# Patient Record
Sex: FEMALE | Race: WHITE | HISPANIC OR LATINO | URBAN - METROPOLITAN AREA
[De-identification: names, ages, dates, MRNs, and addresses within clinical notes are randomized per-mention and may not be internally consistent; named-entity substitution may affect disease eponyms.]

---

## 2017-11-25 ENCOUNTER — APPOINTMENT (EMERGENCY)
Dept: RADIOLOGY | Facility: HOSPITAL | Age: 62
End: 2017-11-25
Payer: COMMERCIAL

## 2017-11-25 ENCOUNTER — HOSPITAL ENCOUNTER (OUTPATIENT)
Facility: HOSPITAL | Age: 62
Setting detail: OBSERVATION
Discharge: HOME | End: 2017-11-25
Attending: EMERGENCY MEDICINE | Admitting: SURGERY
Payer: COMMERCIAL

## 2017-11-25 VITALS
BODY MASS INDEX: 35.7 KG/M2 | TEMPERATURE: 98.4 F | WEIGHT: 201.5 LBS | HEART RATE: 70 BPM | DIASTOLIC BLOOD PRESSURE: 67 MMHG | HEIGHT: 63 IN | SYSTOLIC BLOOD PRESSURE: 143 MMHG | RESPIRATION RATE: 20 BRPM | OXYGEN SATURATION: 96 %

## 2017-11-25 DIAGNOSIS — K56.609 SBO (SMALL BOWEL OBSTRUCTION) (HCC): Primary | ICD-10-CM

## 2017-11-25 LAB
ALBUMIN SERPL BCP-MCNC: 3.8 G/DL (ref 3.5–5)
ALP SERPL-CCNC: 98 U/L (ref 46–116)
ALT SERPL W P-5'-P-CCNC: 25 U/L (ref 12–78)
ANION GAP SERPL CALCULATED.3IONS-SCNC: 7 MMOL/L (ref 4–13)
AST SERPL W P-5'-P-CCNC: 21 U/L (ref 5–45)
ATRIAL RATE: 77 BPM
BASOPHILS # BLD AUTO: 0.02 THOUSANDS/ΜL (ref 0–0.1)
BASOPHILS NFR BLD AUTO: 0 % (ref 0–1)
BILIRUB SERPL-MCNC: 0.47 MG/DL (ref 0.2–1)
BUN SERPL-MCNC: 20 MG/DL (ref 5–25)
CALCIUM SERPL-MCNC: 9.4 MG/DL (ref 8.3–10.1)
CHLORIDE SERPL-SCNC: 102 MMOL/L (ref 100–108)
CO2 SERPL-SCNC: 27 MMOL/L (ref 21–32)
CREAT SERPL-MCNC: 0.69 MG/DL (ref 0.6–1.3)
EOSINOPHIL # BLD AUTO: 0.06 THOUSAND/ΜL (ref 0–0.61)
EOSINOPHIL NFR BLD AUTO: 1 % (ref 0–6)
ERYTHROCYTE [DISTWIDTH] IN BLOOD BY AUTOMATED COUNT: 14.2 % (ref 11.6–15.1)
GFR SERPL CREATININE-BSD FRML MDRD: 94 ML/MIN/1.73SQ M
GLUCOSE SERPL-MCNC: 166 MG/DL (ref 65–140)
HCT VFR BLD AUTO: 41.2 % (ref 34.8–46.1)
HGB BLD-MCNC: 13.5 G/DL (ref 11.5–15.4)
LIPASE SERPL-CCNC: 64 U/L (ref 73–393)
LYMPHOCYTES # BLD AUTO: 1.5 THOUSANDS/ΜL (ref 0.6–4.47)
LYMPHOCYTES NFR BLD AUTO: 15 % (ref 14–44)
MCH RBC QN AUTO: 25.4 PG (ref 26.8–34.3)
MCHC RBC AUTO-ENTMCNC: 32.8 G/DL (ref 31.4–37.4)
MCV RBC AUTO: 77 FL (ref 82–98)
MONOCYTES # BLD AUTO: 0.48 THOUSAND/ΜL (ref 0.17–1.22)
MONOCYTES NFR BLD AUTO: 5 % (ref 4–12)
NEUTROPHILS # BLD AUTO: 8.25 THOUSANDS/ΜL (ref 1.85–7.62)
NEUTS SEG NFR BLD AUTO: 79 % (ref 43–75)
NRBC BLD AUTO-RTO: 0 /100 WBCS
P AXIS: 68 DEGREES
PLATELET # BLD AUTO: 232 THOUSANDS/UL (ref 149–390)
PMV BLD AUTO: 10 FL (ref 8.9–12.7)
POTASSIUM SERPL-SCNC: 3.7 MMOL/L (ref 3.5–5.3)
PR INTERVAL: 156 MS
PROT SERPL-MCNC: 8.8 G/DL (ref 6.4–8.2)
QRS AXIS: 20 DEGREES
QRSD INTERVAL: 90 MS
QT INTERVAL: 356 MS
QTC INTERVAL: 402 MS
RBC # BLD AUTO: 5.32 MILLION/UL (ref 3.81–5.12)
SODIUM SERPL-SCNC: 136 MMOL/L (ref 136–145)
SPECIMEN SOURCE: NORMAL
T WAVE AXIS: 31 DEGREES
TROPONIN I BLD-MCNC: 0 NG/ML (ref 0–0.08)
VENTRICULAR RATE: 77 BPM
WBC # BLD AUTO: 10.33 THOUSAND/UL (ref 4.31–10.16)

## 2017-11-25 PROCEDURE — 85025 COMPLETE CBC W/AUTO DIFF WBC: CPT | Performed by: EMERGENCY MEDICINE

## 2017-11-25 PROCEDURE — 84484 ASSAY OF TROPONIN QUANT: CPT

## 2017-11-25 PROCEDURE — 93005 ELECTROCARDIOGRAM TRACING: CPT

## 2017-11-25 PROCEDURE — 96375 TX/PRO/DX INJ NEW DRUG ADDON: CPT

## 2017-11-25 PROCEDURE — 36415 COLL VENOUS BLD VENIPUNCTURE: CPT

## 2017-11-25 PROCEDURE — 99285 EMERGENCY DEPT VISIT HI MDM: CPT

## 2017-11-25 PROCEDURE — 96376 TX/PRO/DX INJ SAME DRUG ADON: CPT

## 2017-11-25 PROCEDURE — 80053 COMPREHEN METABOLIC PANEL: CPT | Performed by: EMERGENCY MEDICINE

## 2017-11-25 PROCEDURE — 90686 IIV4 VACC NO PRSV 0.5 ML IM: CPT | Performed by: SURGERY

## 2017-11-25 PROCEDURE — 96374 THER/PROPH/DIAG INJ IV PUSH: CPT

## 2017-11-25 PROCEDURE — 93005 ELECTROCARDIOGRAM TRACING: CPT | Performed by: EMERGENCY MEDICINE

## 2017-11-25 PROCEDURE — 83690 ASSAY OF LIPASE: CPT | Performed by: EMERGENCY MEDICINE

## 2017-11-25 PROCEDURE — 74177 CT ABD & PELVIS W/CONTRAST: CPT

## 2017-11-25 RX ORDER — KETOROLAC TROMETHAMINE 30 MG/ML
INJECTION, SOLUTION INTRAMUSCULAR; INTRAVENOUS
Status: DISCONTINUED
Start: 2017-11-25 | End: 2017-11-25 | Stop reason: WASHOUT

## 2017-11-25 RX ORDER — KETOROLAC TROMETHAMINE 30 MG/ML
15 INJECTION, SOLUTION INTRAMUSCULAR; INTRAVENOUS ONCE
Status: DISCONTINUED | OUTPATIENT
Start: 2017-11-25 | End: 2017-11-25

## 2017-11-25 RX ORDER — DEXTROSE, SODIUM CHLORIDE, AND POTASSIUM CHLORIDE 5; .45; .15 G/100ML; G/100ML; G/100ML
75 INJECTION INTRAVENOUS CONTINUOUS
Status: DISCONTINUED | OUTPATIENT
Start: 2017-11-25 | End: 2017-11-25

## 2017-11-25 RX ORDER — ACETAMINOPHEN 325 MG/1
650 TABLET ORAL EVERY 6 HOURS PRN
Status: DISCONTINUED | OUTPATIENT
Start: 2017-11-25 | End: 2017-11-25 | Stop reason: HOSPADM

## 2017-11-25 RX ORDER — SODIUM CHLORIDE 9 MG/ML
125 INJECTION, SOLUTION INTRAVENOUS CONTINUOUS
Status: DISCONTINUED | OUTPATIENT
Start: 2017-11-25 | End: 2017-11-25

## 2017-11-25 RX ORDER — OXYCODONE HYDROCHLORIDE 5 MG/1
5 TABLET ORAL EVERY 4 HOURS PRN
Status: DISCONTINUED | OUTPATIENT
Start: 2017-11-25 | End: 2017-11-25 | Stop reason: HOSPADM

## 2017-11-25 RX ORDER — OXYCODONE HYDROCHLORIDE 10 MG/1
10 TABLET ORAL EVERY 4 HOURS PRN
Status: DISCONTINUED | OUTPATIENT
Start: 2017-11-25 | End: 2017-11-25 | Stop reason: HOSPADM

## 2017-11-25 RX ORDER — ONDANSETRON 2 MG/ML
4 INJECTION INTRAMUSCULAR; INTRAVENOUS ONCE
Status: COMPLETED | OUTPATIENT
Start: 2017-11-25 | End: 2017-11-25

## 2017-11-25 RX ORDER — ENALAPRIL MALEATE AND HYDROCHLOROTHIAZIDE 10; 25 MG/1; MG/1
1 TABLET ORAL DAILY
COMMUNITY

## 2017-11-25 RX ORDER — ONDANSETRON 2 MG/ML
INJECTION INTRAMUSCULAR; INTRAVENOUS
Status: COMPLETED
Start: 2017-11-25 | End: 2017-11-25

## 2017-11-25 RX ORDER — ONDANSETRON 2 MG/ML
4 INJECTION INTRAMUSCULAR; INTRAVENOUS EVERY 6 HOURS PRN
Status: DISCONTINUED | OUTPATIENT
Start: 2017-11-25 | End: 2017-11-25 | Stop reason: HOSPADM

## 2017-11-25 RX ADMIN — INFLUENZA VIRUS VACCINE 0.5 ML: 15; 15; 15; 15 SUSPENSION INTRAMUSCULAR at 18:18

## 2017-11-25 RX ADMIN — IOHEXOL 100 ML: 350 INJECTION, SOLUTION INTRAVENOUS at 02:41

## 2017-11-25 RX ADMIN — DEXTROSE, SODIUM CHLORIDE, AND POTASSIUM CHLORIDE 75 ML/HR: 5; .45; .15 INJECTION INTRAVENOUS at 11:15

## 2017-11-25 RX ADMIN — HYDROMORPHONE HYDROCHLORIDE 1 MG: 1 INJECTION, SOLUTION INTRAMUSCULAR; INTRAVENOUS; SUBCUTANEOUS at 03:16

## 2017-11-25 RX ADMIN — HYDROMORPHONE HYDROCHLORIDE 1 MG: 1 INJECTION, SOLUTION INTRAMUSCULAR; INTRAVENOUS; SUBCUTANEOUS at 01:11

## 2017-11-25 RX ADMIN — SODIUM CHLORIDE 125 ML/HR: 0.9 INJECTION, SOLUTION INTRAVENOUS at 04:18

## 2017-11-25 RX ADMIN — ONDANSETRON 4 MG: 2 INJECTION INTRAMUSCULAR; INTRAVENOUS at 01:11

## 2017-11-25 RX ADMIN — OXYCODONE HYDROCHLORIDE 5 MG: 5 TABLET ORAL at 06:01

## 2017-11-25 RX ADMIN — ENOXAPARIN SODIUM 40 MG: 40 INJECTION SUBCUTANEOUS at 09:09

## 2017-11-25 NOTE — H&P
H&P Exam - General Surgery   Fernanda Mccallum 65 y o  female MRN: [id number]  Unit/Bed#: ED 05 Encounter: [id number]    Assessment/Plan     Assessment:  66 yo F with partial SBO    Plan:  - admit to general surgery  - NPO/IVF  - NGT if vomits  - OOB, ambulate  - DVT ppx -- SQ lovenox  - anticipate 1 midnight stay until resolution of symptoms    History of Present Illness     HPI:  Fernanda Mccallum is a 65 y o  female who presents with acute onset nausea and vomiting  Patient also had abdominal pain, relieved with dilaudid in ED  No fevers and chills at home She reports continued nausea, vomited once while in ER and several times yesterday  Last BM was yesterday, and patient reports passing gas while in emergency department  Reports past medical history significant for knee surgery and hysterectomy  Never had similar episodes of nausea and abdominal pain in the past     Review of Systems   Constitutional: Negative  HENT: Negative  Eyes: Negative  Respiratory: Negative  Cardiovascular: Negative  Gastrointestinal: Positive for abdominal pain, nausea and vomiting  Negative for constipation and diarrhea  Endocrine: Negative  Genitourinary: Negative  Musculoskeletal: Negative  Skin: Negative  Allergic/Immunologic: Negative  Neurological: Negative  Hematological: Negative  Psychiatric/Behavioral: Negative  Historical Information   Past Medical History:   Diagnosis Date    Hypertension      History reviewed  No pertinent surgical history  Social History   History   Alcohol Use No     History   Drug Use No     History   Smoking Status    Never Smoker   Smokeless Tobacco    Never Used     Family History: History reviewed  No pertinent family history      Meds/Allergies   all medications and allergies reviewed  No Known Allergies    Objective   First Vitals:   Blood Pressure: (!) 221/112 (11/25/17 0037)  Pulse: 95 (11/25/17 0037)  Temperature: 98 °F (36 7 °C) (11/25/17 0037)  Temp Source: Tympanic (11/25/17 0037)  Respirations: 22 (11/25/17 0037)  Weight - Scale: 89 4 kg (197 lb) (11/25/17 0037)  SpO2: 99 % (11/25/17 0037)    Current Vitals:   Blood Pressure: 159/74 (11/25/17 0300)  Pulse: 68 (11/25/17 0300)  Temperature: 98 °F (36 7 °C) (11/25/17 0037)  Temp Source: Tympanic (11/25/17 0037)  Respirations: (!) 31 (11/25/17 0300)  Weight - Scale: 89 4 kg (197 lb) (11/25/17 0037)  SpO2: 95 % (11/25/17 0300)    No intake or output data in the 24 hours ending 11/25/17 0330    Invasive Devices     Peripheral Intravenous Line            Peripheral IV 11/25/17 Right Arm less than 1 day                Physical Exam   Constitutional: She is oriented to person, place, and time  She appears well-developed and well-nourished  No distress  HENT:   Head: Normocephalic and atraumatic  Eyes: Pupils are equal, round, and reactive to light  Neck: Normal range of motion  Cardiovascular: Normal rate and regular rhythm  Pulmonary/Chest: Effort normal    Abdominal: Soft  She exhibits no distension and no mass  There is tenderness (mild RUQ)  There is no rebound and no guarding  Musculoskeletal: Normal range of motion  Neurological: She is alert and oriented to person, place, and time  Skin: Skin is warm and dry  She is not diaphoretic  Lab Results:   I have personally reviewed pertinent lab results    , CBC:   Lab Results   Component Value Date    WBC 10 33 (H) 11/25/2017    HGB 13 5 11/25/2017    HCT 41 2 11/25/2017    MCV 77 (L) 11/25/2017     11/25/2017    MCH 25 4 (L) 11/25/2017    MCHC 32 8 11/25/2017    RDW 14 2 11/25/2017    MPV 10 0 11/25/2017    NRBC 0 11/25/2017   , CMP:   Lab Results   Component Value Date     11/25/2017    K 3 7 11/25/2017     11/25/2017    CO2 27 11/25/2017    ANIONGAP 7 11/25/2017    BUN 20 11/25/2017    CREATININE 0 69 11/25/2017    GLUCOSE 166 (H) 11/25/2017    CALCIUM 9 4 11/25/2017    AST 21 11/25/2017    ALT 25 11/25/2017    ALKPHOS 98 11/25/2017 PROT 8 8 (H) 11/25/2017    ALBUMIN 3 8 11/25/2017    BILITOT 0 47 11/25/2017    EGFR 94 11/25/2017   , Coagulation: No results found for: PT, INR, APTT  Imaging: I have personally reviewed pertinent reports  and I have personally reviewed pertinent films in PACS  EKG, Pathology, and Other Studies: I have personally reviewed pertinent reports  and I have personally reviewed pertinent films in PACS    Code Status: No Order  Advance Directive and Living Will:      Power of :    POLST:      Counseling / Coordination of Care  Total floor / unit time spent today 30 minutes  Greater than 50% of total time was spent with the patient and / or family counseling and / or coordination of care

## 2017-11-25 NOTE — ED PROVIDER NOTES
History  Chief Complaint   Patient presents with    Abdominal Pain     abdominal pain/vomiting started today     44-year-old female history of hypertension comes emergent department for evaluation of abdominal pain  Patient states that her abdominal pain started yesterday after she ate "pasteles" however the pain subsided  Earlier this morning,  patient stated that she did have continuing pain and in the past 2 hours pain has gotten gradually worse which brought her to the emergency department  Pain is worse in the epigastrium describes the tightening pain and patient has not taken any medication for relief  Patient states she has never had this pain before  Patient she has 7 episodes of emesis  Patient otherwise denies any fever, chest pain, shortness of breath, dysuria, constipation or diarrhea  Medical management decision making:  I will do a cardiac evaluation given patient's upper abdominal pain consider troponin EKG also get a CBC CMP and lipase and will give 1 L of fluids 1 mg Dilaudid and will get a CT scan abdomen and pelvis            Prior to Admission Medications   Prescriptions Last Dose Informant Patient Reported? Taking?   enalapril-hydrochlorothiazide (VASERETIC) 10-25 MG per tablet  Self Yes Yes   Sig: Take 1 tablet by mouth daily      Facility-Administered Medications: None       Past Medical History:   Diagnosis Date    Hypertension        History reviewed  No pertinent surgical history  History reviewed  No pertinent family history  I have reviewed and agree with the history as documented  Social History   Substance Use Topics    Smoking status: Never Smoker    Smokeless tobacco: Never Used    Alcohol use No        Review of Systems   Constitutional: Negative for appetite change, chills, diaphoresis, fatigue and fever  HENT: Negative for congestion, ear discharge, ear pain, hearing loss, postnasal drip, rhinorrhea, sneezing and sore throat      Eyes: Negative for pain, discharge and redness  Respiratory: Negative for choking, chest tightness, shortness of breath, wheezing and stridor  Cardiovascular: Negative for chest pain and palpitations  Gastrointestinal: Positive for abdominal pain  Negative for abdominal distention, blood in stool, constipation, diarrhea, nausea and vomiting  Genitourinary: Negative for decreased urine volume, difficulty urinating, dysuria, flank pain, frequency and hematuria  Musculoskeletal: Negative for arthralgias, gait problem, joint swelling and neck pain  Skin: Negative for color change, pallor and rash  Allergic/Immunologic: Negative for environmental allergies, food allergies and immunocompromised state  Neurological: Negative for dizziness, seizures, weakness, light-headedness, numbness and headaches  Hematological: Negative for adenopathy  Does not bruise/bleed easily  Psychiatric/Behavioral: Negative for agitation and behavioral problems  Physical Exam  ED Triage Vitals   Temperature Pulse Respirations Blood Pressure SpO2   11/25/17 0037 11/25/17 0037 11/25/17 0037 11/25/17 0037 11/25/17 0037   98 °F (36 7 °C) 95 22 (!) 221/112 99 %      Temp Source Heart Rate Source Patient Position - Orthostatic VS BP Location FiO2 (%)   11/25/17 0037 11/25/17 0100 11/25/17 0100 11/25/17 0253 --   Tympanic Monitor Lying Left arm       Pain Score       11/25/17 0037       9           Orthostatic Vital Signs  Vitals:    11/25/17 0300 11/25/17 0416 11/25/17 0725 11/25/17 1547   BP: 159/74 140/70 135/69 143/67   Pulse: 68 77 69 70   Patient Position - Orthostatic VS:  Lying Lying Lying       Physical Exam   Constitutional: She is oriented to person, place, and time  She appears well-developed and well-nourished  HENT:   Head: Normocephalic and atraumatic  Nose: Nose normal    Mouth/Throat: Oropharynx is clear and moist    Eyes: Conjunctivae and EOM are normal  Pupils are equal, round, and reactive to light     Neck: Normal range of motion  Neck supple  Cardiovascular: Normal rate, regular rhythm and normal heart sounds  Exam reveals no gallop and no friction rub  No murmur heard  Pulmonary/Chest: Effort normal and breath sounds normal    Abdominal: Soft  Bowel sounds are normal  She exhibits no distension  There is tenderness  There is rebound and guarding  Musculoskeletal: Normal range of motion  Neurological: She is alert and oriented to person, place, and time  She has normal reflexes  Skin: Skin is warm and dry  No erythema  No pallor  Psychiatric: She has a normal mood and affect  Her behavior is normal    Nursing note and vitals reviewed        ED Medications  Medications   ondansetron (ZOFRAN) injection 4 mg (not administered)   enoxaparin (LOVENOX) subcutaneous injection 40 mg (40 mg Subcutaneous Given 11/25/17 0909)   acetaminophen (TYLENOL) tablet 650 mg (not administered)   oxyCODONE (ROXICODONE) IR tablet 5 mg (5 mg Oral Given 11/25/17 0601)   oxyCODONE (ROXICODONE) immediate release tablet 10 mg (not administered)   ondansetron (ZOFRAN) injection 4 mg (4 mg Intravenous Given 11/25/17 0111)   HYDROmorphone (DILAUDID) 1 mg/mL injection 1 mg (1 mg Intravenous Given 11/25/17 0111)   iohexol (OMNIPAQUE) 350 MG/ML injection (MULTI-DOSE) 100 mL (100 mL Intravenous Given 11/25/17 0241)   HYDROmorphone (DILAUDID) 1 mg/mL injection 1 mg (1 mg Intravenous Given 11/25/17 0316)   influenza inactivated quadrivalent vaccine (FLULAVAL) IM injection 0 5 mL (0 5 mL Intramuscular Given 11/25/17 1818)       Diagnostic Studies  Results Reviewed     Procedure Component Value Units Date/Time    POCT troponin [[id number]]  (Normal) Collected:  11/25/17 0142    Lab Status:  Final result Updated:  11/25/17 0155     POC Troponin I 0 00 ng/ml      Specimen Type VENOUS    Narrative:         [employer] i-Stat handheld analyzer 99% cutoff is > 0 08ng/mL in network Emergency Departments    o cTnI 99% cutoff is useful only when applied to patients in the clinical setting of myocardial ischemia  o cTnI 99% cutoff should be interpreted in the context of clinical history, ECG findings and possibly cardiac imaging to establish correct diagnosis  o cTnI 99% cutoff may be suggestive but clearly not indicative of a coronary event without the clinical setting of myocardial ischemia  Comprehensive metabolic panel [[id number]]  (Abnormal) Collected:  11/25/17 0058    Lab Status:  Final result Specimen:  Blood from Arm, Right Updated:  11/25/17 0125     Sodium 136 mmol/L      Potassium 3 7 mmol/L      Chloride 102 mmol/L      CO2 27 mmol/L      Anion Gap 7 mmol/L      BUN 20 mg/dL      Creatinine 0 69 mg/dL      Glucose 166 (H) mg/dL      Calcium 9 4 mg/dL      AST 21 U/L      ALT 25 U/L      Alkaline Phosphatase 98 U/L      Total Protein 8 8 (H) g/dL      Albumin 3 8 g/dL      Total Bilirubin 0 47 mg/dL      eGFR 94 ml/min/1 73sq m     Narrative:         National Kidney Disease Education Program recommendations are as follows:  GFR calculation is accurate only with a steady state creatinine  Chronic Kidney disease less than 60 ml/min/1 73 sq  meters  Kidney failure less than 15 ml/min/1 73 sq  meters      Lipase [[id number]]  (Abnormal) Collected:  11/25/17 0058    Lab Status:  Final result Specimen:  Blood from Arm, Right Updated:  11/25/17 0125     Lipase 64 (L) u/L     CBC and differential [[id number]]  (Abnormal) Collected:  11/25/17 0058    Lab Status:  Final result Specimen:  Blood from Arm, Right Updated:  11/25/17 0110     WBC 10 33 (H) Thousand/uL      RBC 5 32 (H) Million/uL      Hemoglobin 13 5 g/dL      Hematocrit 41 2 %      MCV 77 (L) fL      MCH 25 4 (L) pg      MCHC 32 8 g/dL      RDW 14 2 %      MPV 10 0 fL      Platelets 173 Thousands/uL      nRBC 0 /100 WBCs      Neutrophils Relative 79 (H) %      Lymphocytes Relative 15 %      Monocytes Relative 5 %      Eosinophils Relative 1 %      Basophils Relative 0 %      Neutrophils Absolute 8 25 (H) Thousands/µL Lymphocytes Absolute 1 50 Thousands/µL      Monocytes Absolute 0 48 Thousand/µL      Eosinophils Absolute 0 06 Thousand/µL      Basophils Absolute 0 02 Thousands/µL                  CT abdomen pelvis with contrast   Final Result by Mirela Olson MD (11/25 1017)   1  Small bowel obstruction with probable transition point in the right pelvis  Findings are consistent with the preliminary report from [facility] which was provided shortly after completion of the exam                       Workstation performed: [id number]               Procedures  Procedures      Phone Consults  ED Phone Contact    ED Course  ED Course as of Nov 25 2109   Sat Nov 25, 2017   0131 ECG 12 Lead Documentation  Date/Time: 11/25/2017 1:30 AM  Performed by: Yamel Horner  Authorized by: Hamida Kim     Indications / Diagnosis:  77  ECG reviewed by me, the ED Provider: yes    Patient location:  ED  Previous ECG:     Previous ECG:  Unavailable    Comparison to cardiac monitor: Yes    Interpretation:     Interpretation: normal    Rate:     ECG rate:  77    ECG rate assessment: normal    Rhythm:     Rhythm: sinus rhythm    Ectopy:     Ectopy: none    QRS:     QRS axis:  Normal    QRS intervals:  Normal  Conduction:     Conduction: normal    ST segments:     ST segments:  Normal  T waves:     T waves: normal          [facility] was contacted   Evaluating patient                                MDM  CritCare Time    Disposition  Final diagnoses:   SBO (small bowel obstruction)     Time reflects when diagnosis was documented in both MDM as applicable and the Disposition within this note     Time User Action Codes Description Comment    11/25/2017  9:09 PM Venkat Farrar Add [D55 389] SBO (small bowel obstruction)       ED Disposition     ED Disposition Condition Comment    Admit  Case was discussed with [facility] and the patient's admission status was agreed to be Admission Status: inpatient status to the service of   [facility]   Follow-up Information     Follow up With Specialties Details Why Contact Info    Bladimir Carrillo MD General Surgery Follow up Please follow up with a member of the general surgery team if needed or if symptoms recur [address] [facility]  [address]          Discharge Medication List as of 11/25/2017  6:21 PM      CONTINUE these medications which have NOT CHANGED    Details   enalapril-hydrochlorothiazide (VASERETIC) 10-25 MG per tablet Take 1 tablet by mouth daily, Historical Med             Outpatient Discharge Orders  Discharge Diet     Activity as tolerated     Call provider for:  persistent nausea or vomiting     Call provider for:  severe uncontrolled pain         ED Provider  Attending physically available and evaluated Priyanka Aguilar I managed the patient along with the ED Attending      Electronically Signed by         Juliette Hudson MD  Resident  11/25/17 3834

## 2017-11-25 NOTE — ED ATTENDING ATTESTATION
Anuja Del Cid MD, saw and evaluated the patient  I have discussed the patient with the resident/non-physician practitioner and agree with the resident's/non-physician practitioner's findings, Plan of Care, and MDM as documented in the resident's/non-physician practitioner's note, except where noted  All available labs and Radiology studies were reviewed  At this point I agree with the current assessment done in the Emergency Department  I have conducted an independent evaluation of this patient including a focused history of:    Emergency Department Note- Sofya Mao 65 y o  female MRN: [id number]    Unit/Bed#: [facility] 633-01 Encounter: [id number]    Sofya Mao is a 65 y o  female who presents with   Chief Complaint   Patient presents with    Abdominal Pain     abdominal pain/vomiting started today         History of Present Illness   HPI:  Sofya Mao is a 65 y o  female who presents for evaluation of: The abrupt onset of abdominal pain that began earlier today  The patient has associated nausea and vomiting  She has primarily vomited mucus; she has not vomited blood or bilious material   The abdominal pain is diffuse  She also notes abdominal distension  The patient denies prior history of abdominal surgery  Movement exacerbates her discomfort  Lying very still palliates some of her discomfort; however, the pain does not completely go away  The patient has lost all of her appetite  Review of Systems   Constitutional: Positive for appetite change  Negative for fatigue and fever  Gastrointestinal: Positive for abdominal pain, nausea and vomiting  Genitourinary: Negative for dysuria and hematuria  Neurological: Negative for weakness and headaches  All other systems reviewed and are negative  Historical Information   Past Medical History:   Diagnosis Date    Hypertension      History reviewed  No pertinent surgical history    Social History   History   Alcohol Use No     History Drug Use No     History   Smoking Status    Never Smoker   Smokeless Tobacco    Never Used     Family History: non-contributory    Meds/Allergies   all medications and allergies reviewed  No Known Allergies    Objective   First Vitals:   Blood Pressure: (!) 221/112 (11/25/17 0037)  Pulse: 95 (11/25/17 0037)  Temperature: 98 °F (36 7 °C) (11/25/17 0037)  Temp Source: Tympanic (11/25/17 0037)  Respirations: 22 (11/25/17 0037)  Height: 5' 3" (160 cm) (11/25/17 0416)  Weight - Scale: 89 4 kg (197 lb) (11/25/17 0037)  SpO2: 99 % (11/25/17 0037)    Current Vitals:   Blood Pressure: 135/69 (11/25/17 0725)  Pulse: 69 (11/25/17 0725)  Temperature: 97 7 °F (36 5 °C) (11/25/17 0725)  Temp Source: Oral (11/25/17 0725)  Respirations: 20 (11/25/17 0725)  Height: 5' 3" (160 cm) (11/25/17 0416)  Weight - Scale: 91 4 kg (201 lb 8 oz) (11/25/17 0416)  SpO2: 97 % (11/25/17 0725)      Intake/Output Summary (Last 24 hours) at 11/25/17 1448  Last data filed at 11/25/17 1118   Gross per 24 hour   Intake            212 5 ml   Output              650 ml   Net           -437 5 ml       Invasive Devices     Peripheral Intravenous Line            Peripheral IV 11/25/17 Right Arm less than 1 day                Physical Exam   Constitutional: She is oriented to person, place, and time  She appears well-developed and well-nourished  HENT:   Head: Normocephalic and atraumatic  Right Ear: External ear normal    Left Ear: External ear normal    Nose: Nose normal    Mouth/Throat: Oropharynx is clear and moist  No oropharyngeal exudate  Eyes: EOM are normal  Pupils are equal, round, and reactive to light  Neck: Normal range of motion  Neck supple  Cardiovascular: Normal rate and regular rhythm  Pulmonary/Chest: Effort normal and breath sounds normal    Abdominal: Soft  She exhibits distension  There is tenderness  Musculoskeletal: Normal range of motion  She exhibits no deformity     Neurological: She is alert and oriented to person, place, and time  Skin: Skin is warm and dry  Psychiatric: She has a normal mood and affect  Her behavior is normal  Judgment and thought content normal    Nursing note and vitals reviewed  Medical Decision Makin  Acute abdominal pain:  Plan to obtain a CT scan of the abdomen and pelvis to rule out small-bowel obstruction and appendicitis  Plan to obtain a CBC to rule out leukocytosis  Plan to obtain a complete metabolic profile to rule out uremia and acidosis  Plan to obtain a urinalysis to rule out urinary tract infection  Plan to obtain an ECG and a troponin to rule out acute coronary syndrome      Recent Results (from the past 36 hour(s))   CBC and differential    Collection Time: 17 12:58 AM   Result Value Ref Range    WBC 10 33 (H) 4 31 - 10 16 Thousand/uL    RBC 5 32 (H) 3 81 - 5 12 Million/uL    Hemoglobin 13 5 11 5 - 15 4 g/dL    Hematocrit 41 2 34 8 - 46 1 %    MCV 77 (L) 82 - 98 fL    MCH 25 4 (L) 26 8 - 34 3 pg    MCHC 32 8 31 4 - 37 4 g/dL    RDW 14 2 11 6 - 15 1 %    MPV 10 0 8 9 - 12 7 fL    Platelets 993 498 - 181 Thousands/uL    nRBC 0 /100 WBCs    Neutrophils Relative 79 (H) 43 - 75 %    Lymphocytes Relative 15 14 - 44 %    Monocytes Relative 5 4 - 12 %    Eosinophils Relative 1 0 - 6 %    Basophils Relative 0 0 - 1 %    Neutrophils Absolute 8 25 (H) 1 85 - 7 62 Thousands/µL    Lymphocytes Absolute 1 50 0 60 - 4 47 Thousands/µL    Monocytes Absolute 0 48 0 17 - 1 22 Thousand/µL    Eosinophils Absolute 0 06 0 00 - 0 61 Thousand/µL    Basophils Absolute 0 02 0 00 - 0 10 Thousands/µL   Comprehensive metabolic panel    Collection Time: 17 12:58 AM   Result Value Ref Range    Sodium 136 136 - 145 mmol/L    Potassium 3 7 3 5 - 5 3 mmol/L    Chloride 102 100 - 108 mmol/L    CO2 27 21 - 32 mmol/L    Anion Gap 7 4 - 13 mmol/L    BUN 20 5 - 25 mg/dL    Creatinine 0 69 0 60 - 1 30 mg/dL    Glucose 166 (H) 65 - 140 mg/dL    Calcium 9 4 8 3 - 10 1 mg/dL    AST 21 5 - 45 U/L ALT 25 12 - 78 U/L    Alkaline Phosphatase 98 46 - 116 U/L    Total Protein 8 8 (H) 6 4 - 8 2 g/dL    Albumin 3 8 3 5 - 5 0 g/dL    Total Bilirubin 0 47 0 20 - 1 00 mg/dL    eGFR 94 ml/min/1 73sq m   Lipase    Collection Time: 11/25/17 12:58 AM   Result Value Ref Range    Lipase 64 (L) 73 - 393 u/L   ECG 12 lead    Collection Time: 11/25/17  1:26 AM   Result Value Ref Range    Ventricular Rate 77 BPM    Atrial Rate 77 BPM    LA Interval 156 ms    QRSD Interval 90 ms    QT Interval 356 ms    QTC Interval 402 ms    P [place] 68 degrees    QRS Axis 20 degrees    T Wave Axis 31 degrees   POCT troponin    Collection Time: 11/25/17  1:42 AM   Result Value Ref Range    POC Troponin I 0 00 0 00 - 0 08 ng/ml    Specimen Type VENOUS      CT abdomen pelvis with contrast   Final Result   1  Small bowel obstruction with probable transition point in the right pelvis  Findings are consistent with the preliminary report from [facility] which was provided shortly after completion of the exam                       Workstation performed: [id number]               Portions of the record may have been created with voice recognition software  Occasional wrong word or "sound a like" substitutions may have occurred due to the inherent limitations of voice recognition software  Read the chart carefully and recognize, using context, where substitutions have occurred

## 2017-11-25 NOTE — DISCHARGE SUMMARY
Discharge Summary - Dale Thurman 65 y o  female MRN: [id number]    Unit/Bed#: [facility] 633-01 Encounter: [id number]    Admission Date: 11/25/2017   Discharge Date: 11/26/2017    Admitting Diagnosis: Vomiting [R11 10]    HPI: Dale Thurman is a 65 y o  female who presents with acute onset nausea and vomiting  Patient also had abdominal pain, relieved with dilaudid in ED  No fevers and chills at home She reports continued nausea, vomited once while in ER and several times yesterday  Last BM was yesterday, and patient reports passing gas while in emergency department  Reports past medical history significant for knee surgery and hysterectomy  Never had similar episodes of nausea and abdominal pain in the past     Procedures Performed: No orders of the defined types were placed in this encounter  Hospital Course: The patient was admitted overnight on 11/25 after CT showed dilated fluid-filled loops of proximal and mid small bowel, with decompressed distal small bowel and colon compatible with SBO, and a probable transition point in the   right pelvis  Given her history of hysterectomy, the SBO was thought to likely be secondary to adhesions  Her stomach was not largely distended on scan, so NGT was not placed immediately  She was made NPO and given IVF  She did not have any significant laboratory abnormalities on admission  Throughout the day her pain improved and she did not have any further nausea or vomiting episodes  She was also passing gas  She tolerated PO diet and felt well enough to go home that evening  She was instructed to return if symptoms re-occured  She had no significant pain and did not require any prescriptions on discharge      Significant Findings, Care, Treatment and Services Provided: As above    Complications: None    Discharge Diagnosis: Small bowel obstrucion    Condition at Discharge: good     Discharge instructions/Information to patient and family:   See after visit summary for information provided to patient and family  Provisions for Follow-Up Care:  See after visit summary for information related to follow-up care and any pertinent home health orders  Disposition: Home    Planned Readmission: No    Discharge Statement   I spent 30 minutes discharging the patient  This time was spent on the day of discharge  I had direct contact with the patient on the day of discharge  Additional documentation is required if more than 30 minutes were spent on discharge  Discharge Medications:  See after visit summary for reconciled discharge medications provided to patient and family

## 2017-11-25 NOTE — PLAN OF CARE
DISCHARGE PLANNING     Discharge to home or other facility with appropriate resources Completed        DISCHARGE PLANNING - CARE MANAGEMENT     Discharge to post-acute care or home with appropriate resources Completed        INFECTION - ADULT     Absence or prevention of progression during hospitalization Completed     Absence of fever/infection during neutropenic period Completed        PAIN - ADULT     Verbalizes/displays adequate comfort level or baseline comfort level Completed        Potential for Falls     Patient will remain free of falls Completed        SAFETY ADULT     Maintain or return to baseline ADL function Completed     Maintain or return mobility status to optimal level Completed

## 2017-11-25 NOTE — PLAN OF CARE
DISCHARGE PLANNING     Discharge to home or other facility with appropriate resources Progressing        INFECTION - ADULT     Absence or prevention of progression during hospitalization Progressing     Absence of fever/infection during neutropenic period Progressing        PAIN - ADULT     Verbalizes/displays adequate comfort level or baseline comfort level Progressing        Potential for Falls     Patient will remain free of falls Progressing        SAFETY ADULT     Maintain or return to baseline ADL function Progressing     Maintain or return mobility status to optimal level Progressing

## 2017-11-25 NOTE — DISCHARGE INSTRUCTIONS
Obstrucción intestinal   LO QUE NECESITA SABER:   Lien obstrucción intestinal ocurre cuando allen intestino grueso o intestino arredondo sufre un bloqueo completo o parcial  El bloqueo previene que los alimentos y los desechos pasen a través de los intestinos con normalidad  INSTRUCCIONES [facility]BRE [facility]:   Wallace Bodily a alon consultas de control con allen médico según le indicaron  Anote alon preguntas para que se acuerde de hacerlas fouzia alon visitas  Pregúntele a allen [place] vitaminas y minerales son adecuados para usted  · Usted tiene náuseas y vómitos  · Allen abdomen está engrandecido  · Usted no puede defecar o pasar gases  · Usted pierde peso sin proponérselo  · Usted tiene [employer]  · Usted tiene preguntas o inquietudes acerca de allen condición o cuidado  Busque atención médica de inmediato o llame al 911 si:   · Usted tiene dolor abdominal severo que no se le [place]  · Allen corazón esta latiendo más rápido de lo normal     · Usted tiene fiebre  © 2017 [address] Information is for End User's use only and may not be sold, redistributed or otherwise used for commercial purposes  All illustrations and images included in CareNotes® are the copyrighted property of [employer] M [employer]  or Jay Laboy  Esta información es sólo para uso en educación  Allen intención no es darle un consejo médico sobre enfermedades o tratamientos  Colsulte con allen Bary Ines farmacéutico antes de seguir cualquier régimen médico para saber si es seguro y efectivo para usted

## 2017-11-25 NOTE — SOCIAL WORK
Met with patient and her family to complete assessment and explain role of CM  The patient and her spouse were visting daughter and son-in-law in [place] when patient became ill  The patient and her spouse live in [facility]  They live in a two floor home with no FABIANA  The bedroom and bath are on second floor  Prior to admission the patient was independent with all care and drives  She has no HHC and denies MH or D&A treatment  Patient has prescription coverage  And uses [address] [place] [address]  She reports having a PCP in [facility] but cannot recall the name or address  CM reviewed d/c planning process including the following: identifying help at home, patient preference for d/c planning needs, Discharge Lounge, [facility] Meds to Bed program, availability of treatment team to discuss questions or concerns patient and/or family may have regarding understanding medications and recognizing signs and symptoms once discharged  CM also encouraged patient to follow up with all recommended appointments after discharge  Patient advised of importance for patient and family to participate in managing patients medical well being

## 2017-11-26 NOTE — PROGRESS NOTES
Discharge instructions given by Christiano Montgomery  Patient happy to be going home  Walked out with son-in-law